# Patient Record
Sex: FEMALE | Race: WHITE | NOT HISPANIC OR LATINO
[De-identification: names, ages, dates, MRNs, and addresses within clinical notes are randomized per-mention and may not be internally consistent; named-entity substitution may affect disease eponyms.]

---

## 2017-10-03 PROBLEM — Z00.00 ENCOUNTER FOR PREVENTIVE HEALTH EXAMINATION: Status: ACTIVE | Noted: 2017-10-03

## 2022-08-18 ENCOUNTER — APPOINTMENT (OUTPATIENT)
Dept: SPINE | Facility: CLINIC | Age: 72
End: 2022-08-18

## 2022-08-18 ENCOUNTER — NON-APPOINTMENT (OUTPATIENT)
Age: 72
End: 2022-08-18

## 2022-08-18 VITALS
HEART RATE: 64 BPM | DIASTOLIC BLOOD PRESSURE: 74 MMHG | SYSTOLIC BLOOD PRESSURE: 131 MMHG | OXYGEN SATURATION: 93 % | WEIGHT: 175 LBS | HEIGHT: 62 IN | BODY MASS INDEX: 32.2 KG/M2

## 2022-08-18 DIAGNOSIS — M54.2 CERVICALGIA: ICD-10-CM

## 2022-08-18 DIAGNOSIS — G89.29 CERVICALGIA: ICD-10-CM

## 2022-08-18 PROCEDURE — 99203 OFFICE O/P NEW LOW 30 MIN: CPT

## 2022-08-19 RX ORDER — MULTIVITAMIN
TABLET ORAL
Refills: 0 | Status: ACTIVE | COMMUNITY

## 2022-08-19 NOTE — PHYSICAL EXAM
[Person] : oriented to person [Place] : oriented to place [Time] : oriented to time [Short Term Intact] : short term memory intact [Remote Intact] : remote memory intact [Span Intact] : the attention span was normal [Concentration Intact] : normal concentrating ability [Fluency] : fluency intact [Comprehension] : comprehension intact [Current Events] : adequate knowledge of current events [Past History] : adequate knowledge of personal past history [Vocabulary] : adequate range of vocabulary [Cranial Nerves Optic (II)] : visual acuity intact bilaterally,  pupils equal round and reactive to light [Cranial Nerves Oculomotor (III)] : extraocular motion intact [Cranial Nerves Trigeminal (V)] : facial sensation intact symmetrically [Cranial Nerves Facial (VII)] : face symmetrical [Cranial Nerves Vestibulocochlear (VIII)] : hearing was intact bilaterally [Cranial Nerves Glossopharyngeal (IX)] : tongue and palate midline [Cranial Nerves Accessory (XI - Cranial And Spinal)] : head turning and shoulder shrug symmetric [Cranial Nerves Hypoglossal (XII)] : there was no tongue deviation with protrusion [Motor Tone] : muscle tone was normal in all four extremities [Motor Strength] : muscle strength was normal in all four extremities [No Muscle Atrophy] : normal bulk in all four extremities [Sensation Tactile Decrease] : light touch was intact [Abnormal Walk] : normal gait [Balance] : balance was intact [1+] : Ankle jerk left 1+ [Full ROM] : full ROM [No Pain with ROM] : no pain with motion in any direction [Past-pointing] : there was no past-pointing [Tremor] : no tremor present [Plantar Reflex Right Only] : normal on the right [Plantar Reflex Left Only] : normal on the left [Dill] : Dill's sign was not demonstrated [L'Hermitte's] : neck flexion did not produce tingling down the spine/arms [Spurling's - Opposite Side] : Negative Spurling's on opposite side [Spurling's Same Side] : Negative Spurling's on same side

## 2022-08-19 NOTE — ASSESSMENT
[FreeTextEntry1] : 71 year old with chronic neck pain.  Normal neurological exam.  MRI without any disc herniation stenosis or neural impingement.  No need for any surgical intervention.  She will begin a course of physical therapy and return as needed.  I examined and evaluated this patient with the nurse practitioner and we agreed on a plan of care.

## 2022-08-19 NOTE — HISTORY OF PRESENT ILLNESS
[> 3 months] : more  than 3 months [FreeTextEntry1] : Left -sided neck pain [de-identified] : Ms. Acuña is a 71 year old lady here today for a neurosurgical evaluation. She reports left-side neck pain for 3 to 4 years. Neck pain was exacerbated after patient fell. Patient states on 7/4/2022 she fell off a couch landing on her left arm and on 7/7/2022 patient slipped in her tub and hit the back of her head. Loya is 3/10. Patient is taking alternative medication which has been effective. No physical therapy, acupuncture care or pain management has been initiated. Denies any numbness or tingling of upper extremities, balance issues, bowel dysfunction. She occasionally has urinary incontinence for many years. Cervical spine x-rays show degenerative disc disease without any dynamic instability.  Cervical MRI scan shows disc osteophyte complexes with mild foraminal stenosis but no significant central stenosis or cord impingement.

## 2022-10-06 ENCOUNTER — APPOINTMENT (OUTPATIENT)
Dept: ORTHOPEDIC SURGERY | Facility: CLINIC | Age: 72
End: 2022-10-06

## 2022-10-06 VITALS
SYSTOLIC BLOOD PRESSURE: 150 MMHG | WEIGHT: 175 LBS | BODY MASS INDEX: 32.2 KG/M2 | DIASTOLIC BLOOD PRESSURE: 81 MMHG | HEIGHT: 62 IN | HEART RATE: 69 BPM

## 2022-10-06 DIAGNOSIS — Z78.9 OTHER SPECIFIED HEALTH STATUS: ICD-10-CM

## 2022-10-06 PROCEDURE — 20550 NJX 1 TENDON SHEATH/LIGAMENT: CPT | Mod: LT

## 2022-10-06 PROCEDURE — 99203 OFFICE O/P NEW LOW 30 MIN: CPT | Mod: 25

## 2022-10-08 NOTE — DISCUSSION/SUMMARY
[FreeTextEntry1] : The patient has pain on the radial aspect of left wrist.  Patient dates the pain to an episode vacuuming her bedspread, May 2022.  Pain is persisted.  Patient has not been able to see hand surgeon for this problem until today.  Patient describes pain over the radial aspect of the wrist exacerbated by movements of the wrist and thumb.  Patient purchased a small wrist wrap thumb device which has been only slightly beneficial.  Patient is not aware of triggering.  Patient has had no episode of de Quervain's tendinitis in the past of which she is aware.  Has no notable right wrist complaints.\par Examination is consistent with de Quervain's tendinitis left wrist.  I have reviewed treatment  alternatives and associated risk complications and expectations.  Following the discussion the patient requested and was treated with cortisone injection into the left wrist first compartment there was well-tolerated.\par The statistical chances of resolution versus recurrence, and the statistics regarding the possibility of additional injections  were discussed with the patient.\par The following post-injection instructions were given to the patient: The patient should be cautious in activities for two weeks and then increase to full activities.  Thereafter, the patient should return if symptoms continue, or if they fidelina and recur subsequently. If symptoms do not recur, the patient need not return and can be seen on an as needed basis. The patient has expressed understanding and acceptance of analysis, treatment, and recommendations.  All questions answered.\par

## 2022-10-08 NOTE — HISTORY OF PRESENT ILLNESS
[FreeTextEntry1] : Patient is 72 yo LHD female who presents for hand evaluation.\par Pt worked as [employer] . Previously  for [employer], [employer].\par \par The patient sustained a fall off a couch 7/4/2022 and on 7/7/2022 patient slipped in bathtub.  Patient saw Jitendra Black MD for neurosurgical evaluation and was felt not to be in need of any neurosurgical treatment.\par \par Pt complains of pain left wrist and thumb.\par Pain w radial/ulnar deviation. Pain w thumb abduction.\par Patient has symptoms since May 2022 when patient was vacuuming.\par Patient was attempting to see sports medicine physician from [facility] at a satellite office but was never able to see the doctor for various reasons.\par Patient has been wearing a wrist wrap thumb spica which has not been particularly helpful.\par Patient has not taken any medication because of allergic reactions and adverse reaction.\par Patient apply diclofenac gel to a different part of her anatomy and had a adverse topical skin reaction\par Consequently patient is unwilling to take any other medication and has not tried anything for this.

## 2022-10-08 NOTE — PHYSICAL EXAM
[de-identified] : Left wrist\par Marked tenderness first extensor compartment\par Nodule first compartment, tender\par Finkelstein test positive grade 1\par Pain with wrist flexion and active thumb abduction extension, recreates symptoms\par Wrist motion itself is nonpainful\par No other notable wrist pertinent positive findings\par Basal joint manipulation no crepitus or pain\par Left hand\par No A1 pulley tenderness and no triggering in any finger.\par No pertinent MP, PIP, or DIP joint contributory findings.\par \par Right wrist\par First compartment nontender\par Basal joint no pain or crepitus\par No other notable wrist findings\par \par Right hand\par No A1 pulley tenderness and no triggering in any finger.\par No pertinent MP, PIP, or DIP joint contributory findings.\par \par Neurologic: Median, ulnar, and radial motor and sensory are intact. \par Skin: No cyanosis, clubbing, or rashes.\par Vascular: Radial pulses intact.\par Lymphatic: No streaking or epitrochlear adenopathy.\par The patient is awake, alert, and oriented. Affect appropriate. Cooperative.

## 2023-04-06 ENCOUNTER — APPOINTMENT (OUTPATIENT)
Dept: ORTHOPEDIC SURGERY | Facility: CLINIC | Age: 73
End: 2023-04-06
Payer: MEDICARE

## 2023-04-06 VITALS
DIASTOLIC BLOOD PRESSURE: 69 MMHG | BODY MASS INDEX: 32.2 KG/M2 | HEIGHT: 62 IN | SYSTOLIC BLOOD PRESSURE: 147 MMHG | HEART RATE: 73 BPM | WEIGHT: 175 LBS

## 2023-04-06 DIAGNOSIS — M65.4 RADIAL STYLOID TENOSYNOVITIS [DE QUERVAIN]: ICD-10-CM

## 2023-04-06 DIAGNOSIS — M25.532 PAIN IN LEFT WRIST: ICD-10-CM

## 2023-04-06 PROCEDURE — 73110 X-RAY EXAM OF WRIST: CPT | Mod: LT

## 2023-04-06 PROCEDURE — 99214 OFFICE O/P EST MOD 30 MIN: CPT

## 2023-04-06 NOTE — HISTORY OF PRESENT ILLNESS
[FreeTextEntry1] : Patient is 72 yo LHD female (Gwendolyn) was seen 1 prior occasion, 10/6/2022.\par At that time the patient provided history that she had worked as [employer] . Previously  for [employer], [employer].\par \par The patient stated that she sustained a fall off a couch 7/4/2022 and on 7/7/2022 patient slipped in bathtub. Patient saw Jitendra Black MD for neurosurgical evaluation and was felt not to be in need of any neurosurgical treatment.\par \par At that visit patient was complaining of left radial wrist pain since May 2022.  Physical exam was consistent with de Quervain's tendinitis of the left wrist and the patient was treated with bupivacaine 6 mg injection.  The patient has not been seen since that single visit.\par \par TODAY:\par Patient presents for hand follow-up evaluation.\par Pt reports that she was better.\par Patient had an episode when she was catching a falling garbage can and struck the left thumb against a garbage can.\par This occurred approximately 2 to 3 weeks following the cortisone injection.\par Pain was not exactly the same as it had been.\par Patient complains of pain when the thumb is "bent back" indicating thumb hyperextension abduction.\par Patient describes pain at the STT region not as much at the first compartment or basal joint.\par

## 2023-04-06 NOTE — DISCUSSION/SUMMARY
[FreeTextEntry1] : Patient had de Quervain's tendinitis October 2022 and was treated with cortisone injection.  Following the injection the patient states that the pain subsided.  \par A few weeks following, the patient struck the left thumb against a garbage can.  Patient was under the impression at first that the de Quervain's tendinitis came back.  However, with additional questioning the patient acknowledges that the pain at the first extensor compartment has not recurred at any time.  Patient perceives pain somewhat more distally in the left wrist into the base of the thumb.  Patient states that she was able to recreate the pain from time to time.  Patient states that symptoms are infrequent.  Patient describes an episode when the left thumb was momentarily caught under a door handle.  Following this she had pain for a number of weeks but that pain is largely subsided and has not recurred.\par Patient purchased an elastic wrist wrap which she wears and feels some support from.  However the device does not support the thumb in any way.  Patient was not actually aware of this until it was pointed out to her today.\par During the course of physical exam the patient's pain could not be recreated by herself or by examiner by any maneuver or manipulation.  There was no tenderness.  Finkelstein and all provocative tests for de Quervain's tendinitis were negative.  Thumb basal joint, MP joint, IP joint and A1 pulley exam were all negative for notable positive findings.\par Consequently, I have urged patient to resume ordinary activities but to be cautious and prudent about her activities in order to avoid exacerbating thumb pain.  Again, no maneuver manipulation or motion of the thumb and wrist caused any pain today.\par X-rays were essentially unremarkable.\par Patient is encouraged to return if she has recurrence of symptoms or any other hand problem.  Otherwise, return as needed.\par Prognosis uncertain.\par A lengthy and detailed discussion was held with the patient and her  regarding analysis, treatment, and recommendations. All questions have been answered. At the conclusion the patient and her  expressed acceptance and understanding.\par

## 2023-04-06 NOTE — PHYSICAL EXAM
[de-identified] : Left wrist\par No tenderness first extensor compartment\par mild tender at volar corner 1st compartment.\par Nodule first compartment, nontender\par Finkelstein test negative\par No pain with wrist flexion and active thumb abduction extension\par Wrist motion: 50 degrees / 50 degrees not painful\par No radial scaphoid interval tenderness or swelling.\par STT joint palpation: no consistent pain/tenderness\par No other notable wrist pertinent positive findings\par \par Basal joint manipulation minimal crepitus; no pain\par Left hand\par Lt thumb MP: no pain with pass extension\par UCL, RCL stable, no pain w stress\par No A1 pulley tenderness and no triggering in any finger.\par No pertinent MP, PIP, or DIP joint contributory findings.\par \par Right wrist\par First compartment nontender\par Basal joint no pain or crepitus\par STT joint palpation manipulation no pain\par E/F 50/50 degrees without pain\par no other notable wrist findings\par \par Right hand\par No A1 pulley tenderness and no triggering in any finger.\par No pertinent MP, PIP, or DIP joint contributory findings.\par \par Neurologic: Median, ulnar, and radial motor and sensory are intact. \par Skin: No cyanosis, clubbing, or rashes.\par Vascular: Radial pulses intact.\par Lymphatic: No streaking or epitrochlear adenopathy.\par The patient is awake, alert, and oriented. Affect appropriate. Cooperative.  [de-identified] : n [de-identified] : Radiographs ordered and interpreted by me today, reviewed and discussed with the patient today.\par \par 6 views left wrist and hand\par Radiocarpal and midcarpal joint spaces well-maintained.  No obvious degenerative changes or joint space narrowing.\par Basal joint space maintained without osteophyte or joint space narrowing.  MP and PIP joint spaces maintained.\par Slight narrowing DIP 4, 5.\par No obvious radiographic abnormality in the radial aspect of wrist or carpus including thumb including scaphoid, trapezium, CMC joint, MP and IP joints.\par No fractures or dislocations.